# Patient Record
Sex: MALE | Race: BLACK OR AFRICAN AMERICAN | NOT HISPANIC OR LATINO | Employment: FULL TIME | URBAN - METROPOLITAN AREA
[De-identification: names, ages, dates, MRNs, and addresses within clinical notes are randomized per-mention and may not be internally consistent; named-entity substitution may affect disease eponyms.]

---

## 2017-03-16 ENCOUNTER — HOSPITAL ENCOUNTER (EMERGENCY)
Facility: HOSPITAL | Age: 22
Discharge: HOME | End: 2017-03-16
Attending: EMERGENCY MEDICINE
Payer: MEDICAID

## 2017-03-16 VITALS
OXYGEN SATURATION: 98 % | SYSTOLIC BLOOD PRESSURE: 128 MMHG | TEMPERATURE: 98 F | HEIGHT: 69 IN | HEART RATE: 78 BPM | RESPIRATION RATE: 12 BRPM | BODY MASS INDEX: 37.77 KG/M2 | DIASTOLIC BLOOD PRESSURE: 77 MMHG | WEIGHT: 255 LBS

## 2017-03-16 DIAGNOSIS — S61.001D OPEN WOUND OF RIGHT THUMB WITH COMPLICATION, SUBSEQUENT ENCOUNTER: Primary | ICD-10-CM

## 2017-03-16 PROCEDURE — 99281 EMR DPT VST MAYX REQ PHY/QHP: CPT

## 2017-03-16 RX ORDER — CLINDAMYCIN HYDROCHLORIDE 150 MG/1
300 CAPSULE ORAL 4 TIMES DAILY
Qty: 56 CAPSULE | Refills: 0 | Status: SHIPPED | OUTPATIENT
Start: 2017-03-16 | End: 2017-03-16

## 2017-03-16 RX ORDER — CLINDAMYCIN HYDROCHLORIDE 150 MG/1
300 CAPSULE ORAL 4 TIMES DAILY
Qty: 56 CAPSULE | Refills: 0 | Status: SHIPPED | OUTPATIENT
Start: 2017-03-16 | End: 2017-03-23

## 2017-03-16 NOTE — ED AVS SNAPSHOT
[facility]  [address]  [place] [address]               Dedrick Perez   3/16/2017 10:55 PM   ED    Description:  Male : 1995   Department:  [facility]           Your Care was Coordinated By:     Provider Role From To    Mimi Stringer MD Attending Provider 17 1002 --      Reason for Visit     Wound Check           Diagnoses this Visit        Comments    Open wound of right thumb with complication, subsequent encounter    -  Primary       ED Disposition     None           To Do List           Follow-up Information     Follow up with Noé Rebollar Jr, MD.    Specialties:  Hand Surgery, Orthopedic Surgery    Contact information:    [address]  [address]  [facility] [address]  [phone]          Follow up with [facility].    Specialty:  Outpatient Rehab    Contact information:    [address] [address] [phone]    Additional information:    Atrium - 5th Floor        Follow up with Antoine Goode MD In 3 days.    Specialty:  Orthopedic Surgery    Contact information:    [address]  [address]  [place] [id number] [phone]          Follow up with [facility] - ORTHOP[facility].    Specialties:  Orthopedic Surgery, Orthopedic Surgery    Contact information:    [address]  [place] [address] [phone]          Follow up with Antoine Goode MD In 3 days.    Specialty:  Orthopedic Surgery    Contact information:     [facility] [address] [phone]         These Medications        Disp Refills Start End    clindamycin (CLEOCIN) 150 MG capsule 56 capsule 0 3/16/2017 3/23/2017    Take 2 capsules (300 mg total) by mouth 4 (four) times daily. - Oral      [facility]     [facility] On Call Nurse Care Line -  Assistance  Registered nurses in the [facility] On Call Center provide clinical advisement, health education, appointment booking, and  "other advisory services.  Call for this free service at [phone].             Medications           Message regarding Medications     Verify the changes and/or additions to your medication regime listed below are the same as discussed with your clinician today.  If any of these changes or additions are incorrect, please notify your healthcare provider.        START taking these NEW medications        Refills    clindamycin (CLEOCIN) 150 MG capsule 0    Sig: Take 2 capsules (300 mg total) by mouth 4 (four) times daily.    Class: Print    Route: Oral           Verify that the below list of medications is an accurate representation of the medications you are currently taking.  If none reported, the list may be blank. If incorrect, please contact your healthcare provider. Carry this list with you in case of emergency.           Current Medications     clindamycin (CLEOCIN) 150 MG capsule Take 2 capsules (300 mg total) by mouth 4 (four) times daily.           Clinical Reference Information           Your Vitals Were     BP Pulse Temp Resp Height Weight    128/77 (BP Location: Right arm, Patient Position: Sitting) 78 98 °F (36.7 °C) (Oral) 12 5' 9" (1.753 m) 115.7 kg (255 lb)    SpO2 BMI             98% 37.66 kg/m2         Allergies as of 3/16/2017     No Known Allergies      Immunizations Administered on Date of Encounter - 3/16/2017     None      ED Micro, Lab, POCT     None      ED Imaging Orders     None        Discharge Instructions       Followup with an orthopedist within 3-5 days. Take the antibiotics as prescribed.  The discolored area on the thumb is skin that has no blood supply and you need further treatment in order to protect the function of your hand.  If you cannot followup with the doctors listed return to the ED so we can assist you in obtaining followup.     Discharge References/Attachments     WOUND CHECK (INFECTION) (ENGLISH)      [facility] Sign-Up     Activating your [facility] account is as " easy as 1-2-3!     1) Visit [email or web address], select Sign Up Now, enter this activation code and your date of birth, then select Next.  [email or web address]  Expires: 4/30/2017 11:48 PM      2) Create a username and password to use when you visit [facility] in the future and select a security question in case you lose your password and select Next.    3) Enter your e-mail address and click Sign Up!    Additional Information  If you have questions, please e-mail [email or web address] or call [phone] to talk to our [facility] staff. Remember, [facility] is NOT to be used for urgent needs. For medical emergencies, dial 911.         Smoking Cessation     If you would like to quit smoking:   You may be eligible for free services if you are a [place] resident and started smoking cigarettes before September 1, 1988.  Call the Smoking Cessation Trust ([facility]) toll free at (677) 163-4466 or (571) [phone].   Call 7-216-QUIT-NOW if you do not meet the above criteria.             [facility] complies with applicable Federal civil rights laws and does not discriminate on the basis of race, color, national origin, age, disability, or sex.        Language Assistance Services     ATTENTION: Language assistance services are available, free of charge. Please call [phone].      ATENCIÓN: Si habla español, tiene a muniz disposición servicios gratuitos de asistencia lingüística. Llame al [phone].     CHÚ Ý: N?u b?n nói Ti?ng Vi?t, có các d?ch v? h? tr? ngôn ng? mi?n phí dành cho b?n. G?i s? [phone].

## 2017-03-17 NOTE — ED TRIAGE NOTES
"pt reports having sutures X 3 weeks ago done to his right thumb at [facility], states since then the right thumb has become black has "pink pus" drainage intermittently. Denies numbness, has tingling "when air hits the thumb". Pt reports that he has been playing football with the injury, but has been keeping the finger wrapped   Pt right thumb has flap of escar noted   "

## 2017-03-17 NOTE — DISCHARGE INSTRUCTIONS
Followup with an orthopedist within 3-5 days. Take the antibiotics as prescribed.  The discolored area on the thumb is skin that has no blood supply and you need further treatment in order to protect the function of your hand.  If you cannot followup with the doctors listed return to the ED so we can assist you in obtaining followup.

## 2017-05-04 DIAGNOSIS — M24.549: ICD-10-CM

## 2017-05-04 DIAGNOSIS — M25.641 JOINT STIFFNESS OF HAND, RIGHT: Primary | ICD-10-CM

## 2017-05-11 ENCOUNTER — CLINICAL SUPPORT (OUTPATIENT)
Dept: REHABILITATION | Facility: HOSPITAL | Age: 22
End: 2017-05-11
Attending: SURGERY
Payer: MEDICAID

## 2017-05-11 DIAGNOSIS — M25.649 DECREASED RANGE OF MOTION OF THUMB: ICD-10-CM

## 2017-05-11 DIAGNOSIS — M25.541 PAIN IN THUMB JOINT WITH MOVEMENT OF RIGHT HAND: ICD-10-CM

## 2017-05-11 DIAGNOSIS — M62.81 MUSCLE WEAKNESS: ICD-10-CM

## 2017-05-11 PROCEDURE — 97166 OT EVAL MOD COMPLEX 45 MIN: CPT | Mod: PN

## 2017-05-11 NOTE — PATIENT INSTRUCTIONS
IP Flexion (Active Blocked)         Brace thumb below tip joint. Bend joint as far as possible.  Repeat ____ times. Do ____ sessions per day.    Copyright © I. All rights reserved.
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
## 2017-05-26 ENCOUNTER — CLINICAL SUPPORT (OUTPATIENT)
Dept: REHABILITATION | Facility: HOSPITAL | Age: 22
End: 2017-05-26
Attending: SURGERY
Payer: MEDICAID

## 2017-05-26 DIAGNOSIS — M62.81 MUSCLE WEAKNESS: ICD-10-CM

## 2017-05-26 DIAGNOSIS — M25.649 DECREASED RANGE OF MOTION OF THUMB: ICD-10-CM

## 2017-05-26 DIAGNOSIS — M25.541 PAIN IN THUMB JOINT WITH MOVEMENT OF RIGHT HAND: ICD-10-CM

## 2017-05-26 PROCEDURE — 97530 THERAPEUTIC ACTIVITIES: CPT | Mod: PN

## 2017-05-26 NOTE — PROGRESS NOTES
"TIME RECORD    Date:  05/26/2017    Start Time:  1010  Stop Time:  1105    PROCEDURES:    TIMED  Procedure Min.   US 8   MT 15   TE 22             UNTIMED  Procedure Min.   Paraffin with MH 10         Total Timed Minutes:  45  Total Timed Units:  3  Total Untimed Units:  1  Charges Billed/# of units:  4  Medicaid TA x 4    Progress/Current Status    Subjective:     Patient ID: Dedrick Perez is a 21 y.o. male.  Diagnosis:   1. Pain in thumb joint with movement of right hand     2. Decreased range of motion of thumb     3. Muscle weakness     Physician Name:  Adriana Abel MD  Primary Diagnosis:  S/p L Thumb Laceration w/ IP Joint Stiffness   Treatment Diagnosis:  Pain in R thumb, Decreased ROM, Muscle Weakness  Onset Date:  02/25/17  Eval Date:  05/11/17  Certification Period:  5/11/2017 to 07/11/17    Pain: 0 /10  "It's just stiff."    Objective:     Pt seen by OT for S/p L Thumb Laceration w/ IP Joint Stiffness with treatment as follows:  Paraffin with MH x 10 min to L hand for pain management and tissue extensibility.  U/S:  3 MHz, .8 W/cm2, continuous, 8 min to L Thumb IP jt  Manual therapy: provided deep STM, including MFR, CFM, lymphatic drainage technique & scar management, including use of IASTM tools to L thumb.  Therex: as per therex log.  Therex Log:    PROM to IP jt flexion with gentle stretch  X 5 reps   IP joint blocking  2 x 10 reps   Pinky slides 2 x 10 reps   Theraputty: yel/green    molding X 3 min   Gross grasp X 5 reps   Lateral grasp 1 log   Tripod grasp 1 log   isopspheres X 3 min         Assessment:     Pt tolerated tx well with no complaints throughout. He demonstrated good technique with exercises and with noted improvement with IP flexion after MT and TE. Pt may benefit from a static progressive IP flexion splint. Continue OT to address goals.     Patient Education/Response:     Cont HEP    Plans and Goals:     Cont OT 2 times a week for 4 weeks.     Short Term Goals: (in 2 weeks)  1) " Patient will be independent in HEP  2) Decrease pain in R hand/thumb to no more than 5/10 worst  3) Increase AROM in R thumb IP flex by 8-10 deg for improved functioning in ADL/IADL's  4) Increase R  strength by 8-10 psi for increased functional use  5) Decrease edema in R thumb IP to minimal     Long Term Goals: (in 4 weeks)  1) Decrease pain in R thumb to no more than 1-2/10 worst  2) Increase AROM of R thumb IP flexion to WFL for increased functioning in ADL/IADL's  3) Increase strength in R  by 20-25% of initial measures for improved functioning in ADL/IADL's  4) Increased functioning in ADL/IADL's, as evidenced by a FOTO impairment rating of no more than 11%  5) Decrease edema in R thumb IP to trace or none

## 2017-05-30 ENCOUNTER — TELEPHONE (OUTPATIENT)
Dept: REHABILITATION | Facility: HOSPITAL | Age: 22
End: 2017-05-30

## 2017-06-02 ENCOUNTER — TELEPHONE (OUTPATIENT)
Dept: REHABILITATION | Facility: HOSPITAL | Age: 22
End: 2017-06-02

## 2017-06-02 NOTE — TELEPHONE ENCOUNTER
Pt did not show for appointment this day.  Attempted to call but no answer and no voice mail set up.

## 2017-06-08 ENCOUNTER — TELEPHONE (OUTPATIENT)
Dept: REHABILITATION | Facility: HOSPITAL | Age: 22
End: 2017-06-08

## 2017-06-09 ENCOUNTER — TELEPHONE (OUTPATIENT)
Dept: REHABILITATION | Facility: HOSPITAL | Age: 22
End: 2017-06-09

## 2017-06-09 NOTE — TELEPHONE ENCOUNTER
Attempted to call pt regarding no show yesterday and 6/6/17. Pt has had 4 no shows in a row. Pt's voicemail box not set up. Unable to leave message.  Pt appointments removed from calendar due to non compliance with attendance policy. Pt may return to therapy in a session by session basis in the future.

## 2018-04-12 ENCOUNTER — HOSPITAL ENCOUNTER (EMERGENCY)
Facility: HOSPITAL | Age: 23
Discharge: HOME | End: 2018-04-13
Attending: EMERGENCY MEDICINE
Payer: MEDICAID

## 2018-04-12 VITALS
RESPIRATION RATE: 18 BRPM | DIASTOLIC BLOOD PRESSURE: 76 MMHG | WEIGHT: 260 LBS | TEMPERATURE: 98 F | HEIGHT: 70 IN | BODY MASS INDEX: 37.22 KG/M2 | OXYGEN SATURATION: 96 % | SYSTOLIC BLOOD PRESSURE: 139 MMHG | HEART RATE: 87 BPM

## 2018-04-12 DIAGNOSIS — J20.9 ACUTE BRONCHITIS, UNSPECIFIED ORGANISM: Primary | ICD-10-CM

## 2018-04-12 DIAGNOSIS — R05.9 COUGH: ICD-10-CM

## 2018-04-12 PROCEDURE — 99283 EMERGENCY DEPT VISIT LOW MDM: CPT | Mod: 25

## 2018-04-12 PROCEDURE — 99283 EMERGENCY DEPT VISIT LOW MDM: CPT | Mod: ,,, | Performed by: PHYSICIAN ASSISTANT

## 2018-04-12 RX ORDER — PREDNISONE 20 MG/1
20 TABLET ORAL DAILY
Qty: 7 TABLET | Refills: 0 | Status: SHIPPED | OUTPATIENT
Start: 2018-04-12 | End: 2018-04-19

## 2018-04-12 RX ORDER — ALBUTEROL SULFATE 90 UG/1
1-2 AEROSOL, METERED RESPIRATORY (INHALATION) EVERY 6 HOURS PRN
Qty: 1 INHALER | Refills: 0 | Status: SHIPPED | OUTPATIENT
Start: 2018-04-12 | End: 2019-04-12

## 2018-04-13 NOTE — ED PROVIDER NOTES
Encounter Date: 4/12/2018    SCRIBE #1 NOTE: I, Marj Rabago, am scribing for, and in the presence of,  Dr. Tamayo. I have scribed the following portions of the note - the APC attestation.       History     Chief Complaint   Patient presents with    Cough     Reports for the past month everytime he tries to go to sleep he starts coughing. Also reports he can hear wheezing, hx of childhood asthma. Also reports dypsnea on exhertion.      Patient is a 22-year-old male with a history of asthma presenting to the ER for evaluation of cough.  Patient states over the last 3-4 weeks he has had a persistent cough.  Started with URI symptoms with cough, sore throat, runny nose.  He states that most of his symptoms have resolved but the cough has state persisted.  Occasionally productive.  States he does feel short of breath quite often.  He has heard wheezing intermittently as well.  Denies any bowel pain, nausea vomiting.  No fevers or chills at home.  Does not use an inhaler at this time.  No use of nebulizers.  No recent antibiotic use or hospitalizations.      The history is provided by the patient.     Review of patient's allergies indicates:  No Known Allergies  Past Medical History:   Diagnosis Date    Asthma      Past Surgical History:   Procedure Laterality Date    ankle  2010    Right     No family history on file.  Social History   Substance Use Topics    Smoking status: Never Smoker    Smokeless tobacco: Never Used    Alcohol use Yes      Comment: Social     Review of Systems   Constitutional: Negative for chills and fever.   HENT: Negative for congestion, ear pain, postnasal drip and sore throat.    Respiratory: Positive for cough and shortness of breath.    Cardiovascular: Negative for chest pain and palpitations.   Gastrointestinal: Negative for abdominal pain, diarrhea, nausea and vomiting.   Skin: Negative for rash.   Allergic/Immunologic: Negative for immunocompromised state.   Neurological: Negative  for dizziness and weakness.   Psychiatric/Behavioral: Negative for confusion.       Physical Exam     Initial Vitals [04/12/18 2118]   BP Pulse Resp Temp SpO2   139/76 87 18 98.4 °F (36.9 °C) 96 %      MAP       97         Physical Exam    Constitutional: He appears well-developed and well-nourished.   HENT:   Head: Normocephalic and atraumatic.   Mouth/Throat: Oropharynx is clear and moist.   Eyes: Conjunctivae and EOM are normal. Pupils are equal, round, and reactive to light.   Cardiovascular: Normal rate, regular rhythm, normal heart sounds and intact distal pulses.   Pulmonary/Chest: Effort normal. No respiratory distress. He has wheezes (minimal wheeze).   Neurological: He is alert and oriented to person, place, and time.   Skin: Skin is warm and dry.         ED Course   Procedures  Labs Reviewed - No data to display           X-Ray Chest PA And Lateral   Final Result      No acute cardiopulmonary finding.         Electronically signed by: Jesus Salazar MD   Date:    04/12/2018   Time:    23:30          Medical Decision Making:   History:   Old Medical Records: I decided to obtain old medical records.  Clinical Tests:   Radiological Study: Ordered and Reviewed       APC / Resident Notes:   Patient was seen in the ER promptly upon arrival.  He is afebrile no acute distress.  Physical examination reveals very minimal wheezing diffusely.  Chest x-ray does not show any acute pulmonary findings.  Given prolonged symptoms of cough this is likely rhonchi to, viral etiology.  will prescribe albuterol inhaler and prednisone to use as directed.  Advised to use over-the-counter cough medication as needed.  Follow-up with family doctor this week.  Stable for outpatient therapy.    The care of this patient was overseen by attending physician who agrees with treatment, plan, and disposition.         Scribe Attestation:   Scribe #1: I performed the above scribed service and the documentation accurately describes the  services I performed. I attest to the accuracy of the note.    Attending Attestation:     Physician Attestation Statement for NP/PA:   I discussed this assessment and plan of this patient with the NP/PA, but I did not personally examine the patient. The face to face encounter was performed by the NP/PA.                     Clinical Impression:   The primary encounter diagnosis was Acute bronchitis, unspecified organism. A diagnosis of Cough was also pertinent to this visit.    Disposition:   Disposition: Discharged  Condition: Stable                         Nina Edwards PA-C  04/13/18 0005

## 2018-04-13 NOTE — ED NOTES
"Pt states "around this time when I get ready for bed I start coughing bad and my chest tightens up". Pt reports waking up from productive coughing, SOB upon exertion, cold sweatsX1 month. Pt reports taking tylenol and Nyquil/dayquil with no relief. Pt denies N/V/D, abdominal pain, fever.    "

## 2019-05-18 NOTE — TELEPHONE ENCOUNTER
Attempted to call pt regarding no show to today's appointment. Pt's voicemail box not set up to take messages and pt did not answer. This is pt's 3rd no show. Will attempt to call pt tomorrow, and, in line with attendance policy, will remove pt from schedule and potentially reschedule if pt confirms he will attend therapy.  
no
